# Patient Record
Sex: FEMALE | Race: WHITE
[De-identification: names, ages, dates, MRNs, and addresses within clinical notes are randomized per-mention and may not be internally consistent; named-entity substitution may affect disease eponyms.]

---

## 2021-10-25 PROBLEM — Z00.00 ENCOUNTER FOR PREVENTIVE HEALTH EXAMINATION: Status: ACTIVE | Noted: 2021-10-25

## 2021-10-26 ENCOUNTER — APPOINTMENT (OUTPATIENT)
Dept: OBGYN | Facility: CLINIC | Age: 56
End: 2021-10-26
Payer: COMMERCIAL

## 2021-10-26 VITALS — HEIGHT: 62 IN | WEIGHT: 127 LBS | BODY MASS INDEX: 23.37 KG/M2

## 2021-10-26 VITALS — SYSTOLIC BLOOD PRESSURE: 124 MMHG | DIASTOLIC BLOOD PRESSURE: 86 MMHG

## 2021-10-26 DIAGNOSIS — Z78.9 OTHER SPECIFIED HEALTH STATUS: ICD-10-CM

## 2021-10-26 LAB
BILIRUB UR QL STRIP: NORMAL
GLUCOSE UR-MCNC: NORMAL
HCG UR QL: 0.2 EU/DL
HGB UR QL STRIP.AUTO: NORMAL
KETONES UR-MCNC: NORMAL
LEUKOCYTE ESTERASE UR QL STRIP: NORMAL
NITRITE UR QL STRIP: NORMAL
PH UR STRIP: 5
PROT UR STRIP-MCNC: NORMAL
SP GR UR STRIP: 1.02

## 2021-10-26 PROCEDURE — 81003 URINALYSIS AUTO W/O SCOPE: CPT | Mod: QW

## 2021-10-26 PROCEDURE — 99396 PREV VISIT EST AGE 40-64: CPT

## 2021-10-26 NOTE — DISCUSSION/SUMMARY
[FreeTextEntry1] : 57 yo , h/o supracervical hysterectomy, with normal annual exam\par - Pap and HPV collected\par - Will attempt to obtain records for past pap smears/mammos\par - Requisition for next mammogram given\par - Return in 1 year for next annual or PRN

## 2021-10-26 NOTE — PHYSICAL EXAM
[Appropriately responsive] : appropriately responsive [Alert] : alert [No Acute Distress] : no acute distress [Soft] : soft [Non-tender] : non-tender [Non-distended] : non-distended [No Lesions] : no lesions [No Mass] : no mass [Oriented x3] : oriented x3 [Examination Of The Breasts] : a normal appearance [No Masses] : no breast masses were palpable [Vulvar Atrophy] : vulvar atrophy [Labia Majora] : normal [Labia Minora] : normal [Normal] : normal [Absent] : absent [Uterine Adnexae] : non-palpable

## 2021-10-26 NOTE — HISTORY OF PRESENT ILLNESS
[FreeTextEntry1] : 55 yo  presents for annual visit. She denies acute complaint including breast pain, breast discharge, pelvic pain, postmenopausal bleeding, vaginal discharge, or urinary incontinence.\par \par She followed with a gyn regularly, however he recently retired. She had a supracervical hysterectomy approx 18 or 19 years ago due to heavy menses and severe anemia. She believes that she still has her ovaries.\par \par Her PCP Dr. Ching sends her for mammograms, last was approx 4 months ago with needle biopsy and benign findings.\par \par OB: , csection x2, SAB x1, all IVF pregnancies.\par GYN: Denies ovarian cysts, abnormal paps, or STIs. Possible fibroid uterus.\par PMH: Denies\par PSH:  x2, supracervical hysterectomy\par Meds: none\par Allergies: anaphylaxis to penicilin

## 2021-11-01 LAB — HPV HIGH+LOW RISK DNA PNL CVX: NOT DETECTED

## 2021-11-09 LAB — CYTOLOGY CVX/VAG DOC THIN PREP: NORMAL

## 2021-12-06 ENCOUNTER — NON-APPOINTMENT (OUTPATIENT)
Age: 56
End: 2021-12-06

## 2021-12-29 ENCOUNTER — APPOINTMENT (OUTPATIENT)
Dept: OBGYN | Facility: CLINIC | Age: 56
End: 2021-12-29
Payer: COMMERCIAL

## 2021-12-29 VITALS — BODY MASS INDEX: 22.26 KG/M2 | TEMPERATURE: 97.9 F | WEIGHT: 121 LBS | HEIGHT: 62 IN

## 2021-12-29 LAB
BILIRUB UR QL STRIP: NEGATIVE
GLUCOSE UR-MCNC: NEGATIVE
HCG UR QL: 0.2 EU/DL
HGB UR QL STRIP.AUTO: NEGATIVE
KETONES UR-MCNC: NEGATIVE
LEUKOCYTE ESTERASE UR QL STRIP: NORMAL
NITRITE UR QL STRIP: NEGATIVE
PH UR STRIP: 5.5
PROT UR STRIP-MCNC: NEGATIVE
SP GR UR STRIP: 1.02

## 2021-12-29 PROCEDURE — 99213 OFFICE O/P EST LOW 20 MIN: CPT

## 2021-12-29 PROCEDURE — 81003 URINALYSIS AUTO W/O SCOPE: CPT | Mod: QW

## 2021-12-29 NOTE — PHYSICAL EXAM
[Awake] : awake [Alert] : alert [Acute Distress] : no acute distress [Vulvar Atrophy] : vulvar atrophy [Labia Majora] : labia major [Labia Minora] : labia minora [Normal] : clitoris [Discharge] : a  ~M vaginal discharge was present [Moderate] : moderate [White] : white [Thick] : thick [No Bleeding] : there was no active vaginal bleeding [Normal Position] : in a normal position

## 2022-01-04 LAB
A VAGINAE DNA VAG QL NAA+PROBE: ABNORMAL
BVAB2 DNA VAG QL NAA+PROBE: NORMAL
C KRUSEI DNA VAG QL NAA+PROBE: NEGATIVE
C TRACH RRNA SPEC QL NAA+PROBE: NEGATIVE
MEGA1 DNA VAG QL NAA+PROBE: NORMAL
N GONORRHOEA RRNA SPEC QL NAA+PROBE: NEGATIVE
T VAGINALIS RRNA SPEC QL NAA+PROBE: NEGATIVE

## 2022-01-04 RX ORDER — FLUCONAZOLE 150 MG/1
150 TABLET ORAL
Qty: 1 | Refills: 1 | Status: COMPLETED | COMMUNITY
Start: 2021-12-29 | End: 2022-01-04

## 2022-01-21 ENCOUNTER — APPOINTMENT (OUTPATIENT)
Dept: OBGYN | Facility: CLINIC | Age: 57
End: 2022-01-21
Payer: COMMERCIAL

## 2022-01-21 VITALS — WEIGHT: 120 LBS | TEMPERATURE: 97.8 F | BODY MASS INDEX: 22.08 KG/M2 | HEIGHT: 62 IN

## 2022-01-21 VITALS — SYSTOLIC BLOOD PRESSURE: 110 MMHG | DIASTOLIC BLOOD PRESSURE: 64 MMHG

## 2022-01-21 LAB
BILIRUB UR QL STRIP: NEGATIVE
GLUCOSE UR-MCNC: NEGATIVE
HCG UR QL: 0.2 EU/DL
HGB UR QL STRIP.AUTO: NEGATIVE
KETONES UR-MCNC: NEGATIVE
LEUKOCYTE ESTERASE UR QL STRIP: NEGATIVE
NITRITE UR QL STRIP: NEGATIVE
PH UR STRIP: 5
PROT UR STRIP-MCNC: NEGATIVE
SP GR UR STRIP: 1.02

## 2022-01-21 PROCEDURE — 99213 OFFICE O/P EST LOW 20 MIN: CPT

## 2022-01-21 PROCEDURE — 81003 URINALYSIS AUTO W/O SCOPE: CPT | Mod: QW

## 2022-01-21 RX ORDER — METRONIDAZOLE 500 MG/1
500 TABLET ORAL TWICE DAILY
Qty: 14 | Refills: 0 | Status: COMPLETED | COMMUNITY
Start: 2022-01-04 | End: 2022-01-21

## 2022-01-21 RX ORDER — FLUCONAZOLE 150 MG/1
150 TABLET ORAL
Qty: 1 | Refills: 1 | Status: ACTIVE | COMMUNITY
Start: 2022-01-21 | End: 1900-01-01

## 2022-01-21 NOTE — PHYSICAL EXAM
[Appropriately responsive] : appropriately responsive [Alert] : alert [No Acute Distress] : no acute distress [Soft] : soft [Non-tender] : non-tender [Vulvar Atrophy] : vulvar atrophy [Labia Majora] : normal [Labia Minora] : normal [Atrophy] : atrophy [Discharge] : a  ~M vaginal discharge was present [Moderate] : moderate [White] : white [Thick] : thick [No Bleeding] : There was no active vaginal bleeding [Normal] : normal [Absent] : absent [Uterine Adnexae] : non-palpable

## 2022-01-21 NOTE — HISTORY OF PRESENT ILLNESS
[FreeTextEntry1] : 57 yo  s/p treatment of BV presents for new vaginal discharge and discomfort. She completed flagyl 1-2 weeks ago, felt full relief for a few days, then had more vaginal discharge with general discomfort, denies burning, itching, or urinary symptoms.

## 2022-01-21 NOTE — DISCUSSION/SUMMARY
[FreeTextEntry1] : 55 yo  with new vaginitis after BV treatment, likely candida\par - Discussed mechanism of candida infection after antibiotics\par - Sent diflucan to pharmacy, vaginitis panel collected

## 2022-07-11 ENCOUNTER — APPOINTMENT (OUTPATIENT)
Dept: OBGYN | Facility: CLINIC | Age: 57
End: 2022-07-11

## 2022-11-01 ENCOUNTER — APPOINTMENT (OUTPATIENT)
Dept: OBGYN | Facility: CLINIC | Age: 57
End: 2022-11-01

## 2022-11-01 VITALS — DIASTOLIC BLOOD PRESSURE: 70 MMHG | SYSTOLIC BLOOD PRESSURE: 118 MMHG

## 2022-11-01 VITALS — TEMPERATURE: 98 F | BODY MASS INDEX: 23.55 KG/M2 | HEIGHT: 62 IN | WEIGHT: 128 LBS

## 2022-11-01 LAB
BILIRUB UR QL STRIP: NEGATIVE
GLUCOSE UR-MCNC: NEGATIVE
HCG UR QL: 0.2 EU/DL
HGB UR QL STRIP.AUTO: NEGATIVE
KETONES UR-MCNC: NEGATIVE
LEUKOCYTE ESTERASE UR QL STRIP: NORMAL
NITRITE UR QL STRIP: NEGATIVE
PH UR STRIP: 6.5
PROT UR STRIP-MCNC: NEGATIVE
SP GR UR STRIP: 1.01

## 2022-11-01 PROCEDURE — 81003 URINALYSIS AUTO W/O SCOPE: CPT | Mod: QW

## 2022-11-01 PROCEDURE — 99396 PREV VISIT EST AGE 40-64: CPT

## 2022-11-03 LAB — HPV HIGH+LOW RISK DNA PNL CVX: NOT DETECTED

## 2022-11-16 LAB — CYTOLOGY CVX/VAG DOC THIN PREP: ABNORMAL

## 2023-10-06 ENCOUNTER — APPOINTMENT (OUTPATIENT)
Dept: OBGYN | Facility: CLINIC | Age: 58
End: 2023-10-06

## 2023-11-04 DIAGNOSIS — N76.0 ACUTE VAGINITIS: ICD-10-CM

## 2023-11-07 ENCOUNTER — APPOINTMENT (OUTPATIENT)
Dept: OBGYN | Facility: CLINIC | Age: 58
End: 2023-11-07

## 2023-11-28 ENCOUNTER — APPOINTMENT (OUTPATIENT)
Dept: OBGYN | Facility: CLINIC | Age: 58
End: 2023-11-28
Payer: COMMERCIAL

## 2023-11-28 VITALS — SYSTOLIC BLOOD PRESSURE: 118 MMHG | DIASTOLIC BLOOD PRESSURE: 74 MMHG

## 2023-11-28 VITALS — BODY MASS INDEX: 22.82 KG/M2 | WEIGHT: 124 LBS | HEIGHT: 62 IN

## 2023-11-28 DIAGNOSIS — Z01.419 ENCOUNTER FOR GYNECOLOGICAL EXAMINATION (GENERAL) (ROUTINE) W/OUT ABNORMAL FINDINGS: ICD-10-CM

## 2023-11-28 PROCEDURE — 99396 PREV VISIT EST AGE 40-64: CPT

## 2023-11-30 LAB — HPV HIGH+LOW RISK DNA PNL CVX: NOT DETECTED

## 2023-12-07 LAB — CYTOLOGY CVX/VAG DOC THIN PREP: ABNORMAL
